# Patient Record
Sex: FEMALE | Race: OTHER | HISPANIC OR LATINO | Employment: UNEMPLOYED | URBAN - METROPOLITAN AREA
[De-identification: names, ages, dates, MRNs, and addresses within clinical notes are randomized per-mention and may not be internally consistent; named-entity substitution may affect disease eponyms.]

---
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
## 2022-09-24 ENCOUNTER — HOSPITAL ENCOUNTER (EMERGENCY)
Facility: HOSPITAL | Age: 22
Discharge: HOME | End: 2022-09-24
Attending: EMERGENCY MEDICINE
Payer: COMMERCIAL

## 2022-09-24 VITALS
SYSTOLIC BLOOD PRESSURE: 118 MMHG | TEMPERATURE: 98.8 F | HEART RATE: 93 BPM | OXYGEN SATURATION: 100 % | DIASTOLIC BLOOD PRESSURE: 71 MMHG | RESPIRATION RATE: 16 BRPM

## 2022-09-24 DIAGNOSIS — K21.9 ACID REFLUX: Primary | ICD-10-CM

## 2022-09-24 LAB
ALBUMIN SERPL BCP-MCNC: 4.1 G/DL (ref 3.5–5)
ALP SERPL-CCNC: 54 U/L (ref 46–116)
ALT SERPL W P-5'-P-CCNC: 18 U/L (ref 12–78)
ANION GAP SERPL CALCULATED.3IONS-SCNC: 8 MMOL/L (ref 4–13)
AST SERPL W P-5'-P-CCNC: 11 U/L (ref 5–45)
BACTERIA UR QL AUTO: ABNORMAL /HPF
BASOPHILS # BLD AUTO: 0.04 THOUSANDS/ΜL (ref 0–0.1)
BASOPHILS NFR BLD AUTO: 1 % (ref 0–1)
BILIRUB SERPL-MCNC: 0.55 MG/DL (ref 0.2–1)
BILIRUB UR QL STRIP: NEGATIVE
BUN SERPL-MCNC: 9 MG/DL (ref 5–25)
CALCIUM SERPL-MCNC: 9 MG/DL (ref 8.3–10.1)
CHLORIDE SERPL-SCNC: 105 MMOL/L (ref 96–108)
CLARITY UR: CLEAR
CO2 SERPL-SCNC: 28 MMOL/L (ref 21–32)
COLOR UR: YELLOW
CREAT SERPL-MCNC: 0.73 MG/DL (ref 0.6–1.3)
EOSINOPHIL # BLD AUTO: 0.07 THOUSAND/ΜL (ref 0–0.61)
EOSINOPHIL NFR BLD AUTO: 1 % (ref 0–6)
ERYTHROCYTE [DISTWIDTH] IN BLOOD BY AUTOMATED COUNT: 11.9 % (ref 11.6–15.1)
EXT PREG TEST URINE: NEGATIVE
EXT. CONTROL ED NAV: NORMAL
GFR SERPL CREATININE-BSD FRML MDRD: 118 ML/MIN/1.73SQ M
GLUCOSE SERPL-MCNC: 105 MG/DL (ref 65–140)
GLUCOSE UR STRIP-MCNC: NEGATIVE MG/DL
HCT VFR BLD AUTO: 40.9 % (ref 34.8–46.1)
HGB BLD-MCNC: 13.8 G/DL (ref 11.5–15.4)
HGB UR QL STRIP.AUTO: ABNORMAL
IMM GRANULOCYTES # BLD AUTO: 0.02 THOUSAND/UL (ref 0–0.2)
IMM GRANULOCYTES NFR BLD AUTO: 0 % (ref 0–2)
KETONES UR STRIP-MCNC: NEGATIVE MG/DL
LEUKOCYTE ESTERASE UR QL STRIP: NEGATIVE
LIPASE SERPL-CCNC: 139 U/L (ref 73–393)
LYMPHOCYTES # BLD AUTO: 2.9 THOUSANDS/ΜL (ref 0.6–4.47)
LYMPHOCYTES NFR BLD AUTO: 43 % (ref 14–44)
MCH RBC QN AUTO: 31.2 PG (ref 26.8–34.3)
MCHC RBC AUTO-ENTMCNC: 33.7 G/DL (ref 31.4–37.4)
MCV RBC AUTO: 93 FL (ref 82–98)
MONOCYTES # BLD AUTO: 0.43 THOUSAND/ΜL (ref 0.17–1.22)
MONOCYTES NFR BLD AUTO: 6 % (ref 4–12)
MUCOUS THREADS UR QL AUTO: ABNORMAL
NEUTROPHILS # BLD AUTO: 3.23 THOUSANDS/ΜL (ref 1.85–7.62)
NEUTS SEG NFR BLD AUTO: 49 % (ref 43–75)
NITRITE UR QL STRIP: NEGATIVE
NON-SQ EPI CELLS URNS QL MICRO: ABNORMAL /HPF
NRBC BLD AUTO-RTO: 0 /100 WBCS
PH UR STRIP.AUTO: 5.5 [PH] (ref 4.5–8)
PLATELET # BLD AUTO: 262 THOUSANDS/UL (ref 149–390)
PMV BLD AUTO: 10.9 FL (ref 8.9–12.7)
POTASSIUM SERPL-SCNC: 3.7 MMOL/L (ref 3.5–5.3)
PROT SERPL-MCNC: 8.4 G/DL (ref 6.4–8.4)
PROT UR STRIP-MCNC: NEGATIVE MG/DL
RBC # BLD AUTO: 4.42 MILLION/UL (ref 3.81–5.12)
RBC #/AREA URNS AUTO: ABNORMAL /HPF
SODIUM SERPL-SCNC: 141 MMOL/L (ref 135–147)
SP GR UR STRIP.AUTO: >=1.03 (ref 1–1.03)
UROBILINOGEN UR QL STRIP.AUTO: 0.2 E.U./DL
WBC # BLD AUTO: 6.69 THOUSAND/UL (ref 4.31–10.16)
WBC #/AREA URNS AUTO: ABNORMAL /HPF

## 2022-09-24 PROCEDURE — 99284 EMERGENCY DEPT VISIT MOD MDM: CPT

## 2022-09-24 PROCEDURE — 83690 ASSAY OF LIPASE: CPT

## 2022-09-24 PROCEDURE — 85025 COMPLETE CBC W/AUTO DIFF WBC: CPT

## 2022-09-24 PROCEDURE — 81025 URINE PREGNANCY TEST: CPT

## 2022-09-24 PROCEDURE — 36415 COLL VENOUS BLD VENIPUNCTURE: CPT

## 2022-09-24 PROCEDURE — 80053 COMPREHEN METABOLIC PANEL: CPT

## 2022-09-24 PROCEDURE — 81001 URINALYSIS AUTO W/SCOPE: CPT

## 2022-09-24 PROCEDURE — 99284 EMERGENCY DEPT VISIT MOD MDM: CPT | Performed by: EMERGENCY MEDICINE

## 2022-09-24 RX ORDER — FAMOTIDINE 20 MG/1
20 TABLET, FILM COATED ORAL 2 TIMES DAILY
Qty: 30 TABLET | Refills: 0 | Status: SHIPPED | OUTPATIENT
Start: 2022-09-24

## 2022-09-24 RX ORDER — MAGNESIUM HYDROXIDE/ALUMINUM HYDROXICE/SIMETHICONE 120; 1200; 1200 MG/30ML; MG/30ML; MG/30ML
30 SUSPENSION ORAL ONCE
Status: COMPLETED | OUTPATIENT
Start: 2022-09-24 | End: 2022-09-24

## 2022-09-24 RX ORDER — SUCRALFATE 1 G/1
1 TABLET ORAL ONCE
Status: COMPLETED | OUTPATIENT
Start: 2022-09-24 | End: 2022-09-24

## 2022-09-24 RX ORDER — FAMOTIDINE 20 MG/1
20 TABLET, FILM COATED ORAL ONCE
Status: COMPLETED | OUTPATIENT
Start: 2022-09-24 | End: 2022-09-24

## 2022-09-24 RX ORDER — ALUMINA, MAGNESIA, AND SIMETHICONE 2400; 2400; 240 MG/30ML; MG/30ML; MG/30ML
10 SUSPENSION ORAL EVERY 6 HOURS PRN
Qty: 355 ML | Refills: 0 | Status: SHIPPED | OUTPATIENT
Start: 2022-09-24

## 2022-09-24 RX ADMIN — ALUMINUM HYDROXIDE, MAGNESIUM HYDROXIDE, AND DIMETHICONE 30 ML: 200; 20; 200 SUSPENSION ORAL at 04:25

## 2022-09-24 RX ADMIN — SUCRALFATE 1 G: 1 TABLET ORAL at 04:25

## 2022-09-24 RX ADMIN — FAMOTIDINE 20 MG: 20 TABLET ORAL at 04:25

## 2022-09-24 NOTE — ED ATTENDING ATTESTATION
2022  Luda MIRELES DO, saw and evaluated the patient  I have discussed the patient with the resident/non-physician practitioner and agree with the resident's/non-physician practitioner's findings, Plan of Care, and MDM as documented in the resident's/non-physician practitioner's note, except where noted  All available labs and Radiology studies were reviewed  I was present for key portions of any procedure(s) performed by the resident/non-physician practitioner and I was immediately available to provide assistance  At this point I agree with the current assessment done in the Emergency Department  I have conducted an independent evaluation of this patient a history and physical is as follows:    Amarjit MIRELES DO, saw and evaluated the patient  All available labs and X-rays were reviewed  I discussed the patient with the resident / non-physician and agree with the resident's / non-physician practitioner's findings and plan as documented in the resident's / non-physician practicitioner's note, except where noted  At this point, I agree with the current assessment done in the ED      NAME: Juan Bermudez  AGE: 23 y o  SEX: female  : 2000   MRN: [id number]  ENCOUNTER: [id number]    DATE: 2022  TIME: 5:34 AM      History of Present Illness   Juan Bermudez is a 23 y o  female who presents with Abdominal Pain ("My stomach hurts everywhere" for one week, denies NVD)    has a past medical history of Scoliosis        Past Medical History     Past Medical History:   Diagnosis Date    Scoliosis        Past Surgical History   History reviewed  No pertinent surgical history      Social History     Social History     Substance and Sexual Activity   Alcohol Use Never     Social History     Substance and Sexual Activity   Drug Use Never     Social History     Tobacco Use   Smoking Status Never Smoker   Smokeless Tobacco Never Used       Family History     Family History   Problem Relation Age of Onset    Anxiety disorder Mother     No Known Problems Father     Hypertension Maternal Grandmother     Diabetes Maternal Grandmother     Breast cancer Paternal Grandmother     Cancer Paternal Grandfather        Medications Prior to Admission     Prior to Admission medications    Medication Sig Start Date End Date Taking? Authorizing Provider   aluminum-magnesium hydroxide-simethicone (MAALOX MAX) 400-400-40 MG/5ML suspension Take 10 mL by mouth every 6 (six) hours as needed for indigestion or heartburn 9/24/22  Yes Jeffery Dent, DO   famotidine (PEPCID) 20 mg tablet Take 1 tablet (20 mg total) by mouth 2 (two) times a day 9/24/22  Yes Andreabhay Dent, DO   benzoyl peroxide-erythromycin (Benzamycin) gel Apply to affected area once or twice daily  9/23/20 9/23/21  Karla Hernandez PA-C       Allergies     Allergies   Allergen Reactions    Amoxil [Amoxicillin] Rash       Objective     Vitals:    09/24/22 0356   BP: 131/68   Pulse: 101   Resp: 18   Temp: 98 8 °F (37 1 °C)   SpO2: 100%     There is no height or weight on file to calculate BMI    No intake or output data in the 24 hours ending 09/24/22 0534  Invasive Devices  Report    Peripheral Intravenous Line  Duration           Peripheral IV 09/24/22 Right Antecubital <1 day                Physical Exam  General: awake, alert, no acute distress  Head: normocephalic, atraumatic  Eyes: no scleral icterus  Ears: external ears normal, hearing grossly intact  Nose: external exam grossly normal  Neck: symmetric, No JVD noted, trachea midline  Pulmonary: no respiratory distress, no tachypnea noted  Cardiovascular: appears well perfused  Abdomen: no distention noted  Musculoskeletal: no deformities noted, tone normal  Neuro: grossly non-focal  Psych: mood and affect appropriate    Lab Results:    Labs Reviewed   URINE MICROSCOPIC - Abnormal       Result Value Ref Range Status    RBC, UA None Seen  None Seen, 2-4 /hpf Final    WBC, UA 0-1 (*) None Seen, 2-4, 5-60 /hpf Final    Epithelial Cells Occasional  None Seen, Occasional /hpf Final    Bacteria, UA Moderate (*) None Seen, Occasional /hpf Final    MUCUS THREADS Occasional (*) None Seen Final   URINE MACROSCOPIC, POC - Abnormal    Color, UA Yellow   Final    Clarity, UA Clear   Final    pH, UA 5 5  4 5 - 8 0 Final    Leukocytes, UA Negative  Negative Final    Nitrite, UA Negative  Negative Final    Protein, UA Negative  Negative mg/dl Final    Glucose, UA Negative  Negative mg/dl Final    Ketones, UA Negative  Negative mg/dl Final    Urobilinogen, UA 0 2  0 2, 1 0 E U /dl E U /dl Final    Bilirubin, UA Negative  Negative Final    Occult Blood, UA Trace (*) Negative Final    Specific Gravity, UA >=1 030  1 003 - 1 030 Final    Narrative:     CLINITEK RESULT   LIPASE - Normal    Lipase 139  73 - 393 u/L Final   POCT PREGNANCY, URINE - Normal    EXT PREG TEST UR (Ref: Negative) Negative   Final    Control Valid   Final   CBC AND DIFFERENTIAL    WBC 6 69  4 31 - 10 16 Thousand/uL Final    RBC 4 42  3 81 - 5 12 Million/uL Final    Hemoglobin 13 8  11 5 - 15 4 g/dL Final    Hematocrit 40 9  34 8 - 46 1 % Final    MCV 93  82 - 98 fL Final    MCH 31 2  26 8 - 34 3 pg Final    MCHC 33 7  31 4 - 37 4 g/dL Final    RDW 11 9  11 6 - 15 1 % Final    MPV 10 9  8 9 - 12 7 fL Final    Platelets 852  777 - 390 Thousands/uL Final    nRBC 0  /100 WBCs Final    Neutrophils Relative 49  43 - 75 % Final    Immat GRANS % 0  0 - 2 % Final    Lymphocytes Relative 43  14 - 44 % Final    Monocytes Relative 6  4 - 12 % Final    Eosinophils Relative 1  0 - 6 % Final    Basophils Relative 1  0 - 1 % Final    Neutrophils Absolute 3 23  1 85 - 7 62 Thousands/µL Final    Immature Grans Absolute 0 02  0 00 - 0 20 Thousand/uL Final    Lymphocytes Absolute 2 90  0 60 - 4 47 Thousands/µL Final    Monocytes Absolute 0 43  0 17 - 1 22 Thousand/µL Final    Eosinophils Absolute 0 07  0 00 - 0 61 Thousand/µL Final    Basophils Absolute 0 04  0 00 - 0 10 Thousands/µL Final   COMPREHENSIVE METABOLIC PANEL    Sodium 618  135 - 147 mmol/L Final    Potassium 3 7  3 5 - 5 3 mmol/L Final    Chloride 105  96 - 108 mmol/L Final    CO2 28  21 - 32 mmol/L Final    ANION GAP 8  4 - 13 mmol/L Final    BUN 9  5 - 25 mg/dL Final    Creatinine 0 73  0 60 - 1 30 mg/dL Final    Comment: Standardized to IDMS reference method    Glucose 105  65 - 140 mg/dL Final    Comment: If the patient is fasting, the ADA then defines impaired fasting glucose as > 100 mg/dL and diabetes as > or equal to 123 mg/dL  Specimen collection should occur prior to Sulfasalazine administration due to the potential for falsely depressed results  Specimen collection should occur prior to Sulfapyridine administration due to the potential for falsely elevated results  Calcium 9 0  8 3 - 10 1 mg/dL Final    AST 11  5 - 45 U/L Final    Comment: Specimen collection should occur prior to Sulfasalazine administration due to the potential for falsely depressed results  ALT 18  12 - 78 U/L Final    Comment: Specimen collection should occur prior to Sulfasalazine administration due to the potential for falsely depressed results  Alkaline Phosphatase 54  46 - 116 U/L Final    Total Protein 8 4  6 4 - 8 4 g/dL Final    Albumin 4 1  3 5 - 5 0 g/dL Final    Total Bilirubin 0 55  0 20 - 1 00 mg/dL Final    Comment: Use of this assay is not recommended for patients undergoing treatment with eltrombopag due to the potential for falsely elevated results      eGFR 118  ml/min/1 73sq m Final    Narrative:     [place] guidelines for Chronic Kidney Disease (CKD):     Stage 1 with normal or high GFR (GFR > 90 mL/min/1 73 square meters)    Stage 2 Mild CKD (GFR = 60-89 mL/min/1 73 square meters)    Stage 3A Moderate CKD (GFR = 45-59 mL/min/1 73 square meters)    Stage 3B Moderate CKD (GFR = 30-44 mL/min/1 73 square meters)    Stage 4 Severe CKD (GFR = 15-29 mL/min/1 73 square meters)   Stage 5 End Stage CKD (GFR <15 mL/min/1 73 square meters)  Note: GFR calculation is accurate only with a steady state creatinine         Imaging:   No orders to display         Medications given in Emergency Department     Medication Administration - last 24 hours from 09/23/2022 0534 to 09/24/2022 0534       Date/Time Order Dose Route Action Action by     09/24/2022 0425 famotidine (PEPCID) tablet 20 mg 20 mg Oral Given Chana Solomon, KAMILLE     09/24/2022 0425 sucralfate (CARAFATE) tablet 1 g 1 g Oral Given Chana Solomon RN     09/24/2022 0425 aluminum-magnesium hydroxide-simethicone (MYLANTA) oral suspension 30 mL 30 mL Oral Given Chana Solomon RN          Assessment and Plan  Abdominal pain    Patient appears comfortable on exam   Symptoms appear consistent with a probable gastritis  Will check labs, urine pregnancy, treat supportively with GI medications such as Pepcid, Carafate Mylanta and re-evaluate  Labs are normal   Patient feeling much better after treatment  Will discharge home with instructions for acid reflux and return ER precautions  Active Problems:    * No active hospital problems  *      Final Diagnosis:  1   Acid reflux        ED Course         Critical Care Time  Procedures

## 2022-09-24 NOTE — ED NOTES
Patient aware that urine specimen is needed   Unable to provide urine sample at this time     Marko Sanders RN  09/24/22 4139

## 2022-09-24 NOTE — ED PROVIDER NOTES
History  Chief Complaint   Patient presents with    Abdominal Pain     "My stomach hurts everywhere" for one week, denies NVD     Patient is a 53-year-old female with no significant past medical history, presenting to the ED for evaluation of 1 week history abdominal pain described as intermittent sharp, burning pains in the upper abdomen  She states that the pain is worse when lying flat  Tonight, the pain worker about asleep, associated with nausea and increased anxiety, prompting ED evaluation  Patient denies vomiting, diarrhea, chest pain, difficulty breathing  She denies any urinary symptoms and denies pregnancy, stating that she just finished her period last week  Patient states that her mother has history of acid reflux and irritable bowel disease, and she took 1 of her mother's pills that was for stomach inflammation and reports some modest improvement of the pain  Patient states that the pain is not associated with eating  She denies fevers, chills, right upper quadrant pain  Prior to Admission Medications   Prescriptions Last Dose Informant Patient Reported? Taking?   benzoyl peroxide-erythromycin (Benzamycin) gel   No No   Sig: Apply to affected area once or twice daily  Facility-Administered Medications: None       Past Medical History:   Diagnosis Date    Scoliosis        History reviewed  No pertinent surgical history  Family History   Problem Relation Age of Onset    Anxiety disorder Mother     No Known Problems Father     Hypertension Maternal Grandmother     Diabetes Maternal Grandmother     Breast cancer Paternal Grandmother     Cancer Paternal Grandfather      I have reviewed and agree with the history as documented      E-Cigarette/Vaping     E-Cigarette/Vaping Substances     Social History     Tobacco Use    Smoking status: Never Smoker    Smokeless tobacco: Never Used   Substance Use Topics    Alcohol use: Never    Drug use: Never        Review of Systems Constitutional: Negative for chills and fever  HENT: Negative for congestion, ear pain, postnasal drip and sore throat  Eyes: Negative for pain and visual disturbance  Respiratory: Negative for cough and shortness of breath  Cardiovascular: Negative for chest pain and palpitations  Gastrointestinal: Positive for abdominal pain and nausea  Negative for vomiting  Genitourinary: Negative for difficulty urinating, dysuria, frequency, hematuria and urgency  Musculoskeletal: Negative for arthralgias, back pain, myalgias and neck pain  Skin: Negative for color change and rash  Neurological: Negative for dizziness, seizures, syncope, light-headedness and headaches  All other systems reviewed and are negative  Physical Exam  ED Triage Vitals [09/24/22 0356]   Temperature Pulse Respirations Blood Pressure SpO2   98 8 °F (37 1 °C) 101 18 131/68 100 %      Temp src Heart Rate Source Patient Position - Orthostatic VS BP Location FiO2 (%)   -- -- -- -- --      Pain Score       --             Orthostatic Vital Signs  Vitals:    09/24/22 0356   BP: 131/68   Pulse: 101       Physical Exam  Vitals and nursing note reviewed  Constitutional:       General: She is not in acute distress  Appearance: She is well-developed and normal weight  She is not ill-appearing or toxic-appearing  HENT:      Head: Normocephalic and atraumatic  Mouth/Throat:      Mouth: Mucous membranes are moist    Eyes:      Extraocular Movements: Extraocular movements intact  Conjunctiva/sclera: Conjunctivae normal       Pupils: Pupils are equal, round, and reactive to light  Cardiovascular:      Rate and Rhythm: Normal rate and regular rhythm  Heart sounds: No murmur heard  Pulmonary:      Effort: Pulmonary effort is normal  No respiratory distress  Breath sounds: Normal breath sounds  No wheezing, rhonchi or rales  Abdominal:      General: Abdomen is flat   Bowel sounds are normal       Palpations: Abdomen is soft  Tenderness: There is abdominal tenderness in the epigastric area  There is no guarding  Negative signs include Pretty's sign  Hernia: No hernia is present  Musculoskeletal:      Cervical back: Neck supple  Skin:     General: Skin is warm and dry  Capillary Refill: Capillary refill takes less than 2 seconds  Neurological:      Mental Status: She is alert and oriented to person, place, and time     Psychiatric:         Mood and Affect: Mood normal          ED Medications  Medications   famotidine (PEPCID) tablet 20 mg (20 mg Oral Given 9/24/22 0425)   sucralfate (CARAFATE) tablet 1 g (1 g Oral Given 9/24/22 0425)   aluminum-magnesium hydroxide-simethicone (MYLANTA) oral suspension 30 mL (30 mL Oral Given 9/24/22 0425)       Diagnostic Studies  Results Reviewed     Procedure Component Value Units Date/Time    Urine Microscopic [[id number]]  (Abnormal) Collected: 09/24/22 0447    Lab Status: Final result Specimen: Urine, Clean Catch Updated: 09/24/22 0503     RBC, UA None Seen /hpf      WBC, UA 0-1 /hpf      Epithelial Cells Occasional /hpf      Bacteria, UA Moderate /hpf      MUCUS THREADS Occasional    POCT pregnancy, urine [[id number]]  (Normal) Resulted: 09/24/22 0449    Lab Status: Final result Updated: 09/24/22 0450     EXT PREG TEST UR (Ref: Negative) Negative     Control Valid    Urine Macroscopic, POC [[id number]]  (Abnormal) Collected: 09/24/22 0447    Lab Status: Final result Specimen: Urine Updated: 09/24/22 0449     Color, UA Yellow     Clarity, UA Clear     pH, UA 5 5     Leukocytes, UA Negative     Nitrite, UA Negative     Protein, UA Negative mg/dl      Glucose, UA Negative mg/dl      Ketones, UA Negative mg/dl      Urobilinogen, UA 0 2 E U /dl      Bilirubin, UA Negative     Occult Blood, UA Trace     Specific Gravity, UA >=1 030    Narrative:      CLINITEK RESULT    Comprehensive metabolic panel [[id number]] Collected: 09/24/22 0424    Lab Status: Final result Specimen: Blood from Arm, Right Updated: 09/24/22 0448     Sodium 141 mmol/L      Potassium 3 7 mmol/L      Chloride 105 mmol/L      CO2 28 mmol/L      ANION GAP 8 mmol/L      BUN 9 mg/dL      Creatinine 0 73 mg/dL      Glucose 105 mg/dL      Calcium 9 0 mg/dL      AST 11 U/L      ALT 18 U/L      Alkaline Phosphatase 54 U/L      Total Protein 8 4 g/dL      Albumin 4 1 g/dL      Total Bilirubin 0 55 mg/dL      eGFR 118 ml/min/1 73sq m     Narrative:      [place] guidelines for Chronic Kidney Disease (CKD):     Stage 1 with normal or high GFR (GFR > 90 mL/min/1 73 square meters)    Stage 2 Mild CKD (GFR = 60-89 mL/min/1 73 square meters)    Stage 3A Moderate CKD (GFR = 45-59 mL/min/1 73 square meters)    Stage 3B Moderate CKD (GFR = 30-44 mL/min/1 73 square meters)    Stage 4 Severe CKD (GFR = 15-29 mL/min/1 73 square meters)    Stage 5 End Stage CKD (GFR <15 mL/min/1 73 square meters)  Note: GFR calculation is accurate only with a steady state creatinine    Lipase [[id number]]  (Normal) Collected: 09/24/22 0424    Lab Status: Final result Specimen: Blood from Arm, Right Updated: 09/24/22 0448     Lipase 139 u/L     CBC and differential [[id number]] Collected: 09/24/22 0424    Lab Status: Final result Specimen: Blood from Arm, Right Updated: 09/24/22 0434     WBC 6 69 Thousand/uL      RBC 4 42 Million/uL      Hemoglobin 13 8 g/dL      Hematocrit 40 9 %      MCV 93 fL      MCH 31 2 pg      MCHC 33 7 g/dL      RDW 11 9 %      MPV 10 9 fL      Platelets 181 Thousands/uL      nRBC 0 /100 WBCs      Neutrophils Relative 49 %      Immat GRANS % 0 %      Lymphocytes Relative 43 %      Monocytes Relative 6 %      Eosinophils Relative 1 %      Basophils Relative 1 %      Neutrophils Absolute 3 23 Thousands/µL      Immature Grans Absolute 0 02 Thousand/uL      Lymphocytes Absolute 2 90 Thousands/µL      Monocytes Absolute 0 43 Thousand/µL      Eosinophils Absolute 0 07 Thousand/µL      Basophils Absolute 0 04 Thousands/µL                  No orders to display         Procedures  Procedures      ED Course        symptoms are consistent with acid reflux, possibly gastritis  Will do CBC, CMP, lipase to evaluate for possible electrolyte abnormality, pancreatitis  UA ordered with urine preg  Patient given Pepcid p o  And Maalox p o  On re-evaluation, patient reports moderate improvement the pain  I discussed with she should see her PCP to arrange prompt possibly see a GI specialist   Patient will be started on Pepcid and Maalox at home  She agrees with the plan and verbalized her understanding  She is ambulatory at discharge without issue  MDM    Disposition  Final diagnoses:   Acid reflux     Time reflects when diagnosis was documented in both MDM as applicable and the Disposition within this note     Time User Action Codes Description Comment    9/24/2022  5:25 AM Ivankaren Mobley Add [K21 9] Acid reflux       ED Disposition     ED Disposition   Discharge    Condition   Stable    Date/Time   Sat Sep 24, 2022  5:25 AM    Comment   Sharlene Moreno discharge to home/self care  Follow-up Information     Follow up With Specialties Details Why Contact Info    Jacque Lesches, MD Pediatrics   [address]  [facility] [address]  [place] [address]  [phone]            Patient's Medications   Discharge Prescriptions    ALUMINUM-MAGNESIUM HYDROXIDE-SIMETHICONE (MAALOX MAX) 400-400-40 MG/5ML SUSPENSION    Take 10 mL by mouth every 6 (six) hours as needed for indigestion or heartburn       Start Date: 9/24/2022 End Date: --       Order Dose: 10 mL       Quantity: 355 mL    Refills: 0    FAMOTIDINE (PEPCID) 20 MG TABLET    Take 1 tablet (20 mg total) by mouth 2 (two) times a day       Start Date: 9/24/2022 End Date: --       Order Dose: 20 mg       Quantity: 30 tablet    Refills: 0     No discharge procedures on file      PDMP Review     None           ED Provider  Attending physically available and evaluated Husam Gill [place] Doctor  I managed the patient along with the ED Attending      Electronically Signed by         Nayely Thorpe DO  09/24/22 8130

## 2022-09-24 NOTE — DISCHARGE INSTRUCTIONS
I believe symptoms are due to irritation of the stomach lining, consistent with gastritis  and/or GERD  Please take Pepcid and Maalox as prescribed for symptoms  Stay away from chocolate, spicy food, coffee, Motrin  Take Tylenol for discomfort  Return to the ED with any new/concerning issues

## 2023-12-08 ENCOUNTER — HOSPITAL ENCOUNTER (EMERGENCY)
Facility: HOSPITAL | Age: 23
Discharge: HOME | End: 2023-12-08
Attending: EMERGENCY MEDICINE

## 2023-12-08 VITALS
HEART RATE: 102 BPM | TEMPERATURE: 97.7 F | BODY MASS INDEX: 19.22 KG/M2 | OXYGEN SATURATION: 97 % | RESPIRATION RATE: 19 BRPM | SYSTOLIC BLOOD PRESSURE: 104 MMHG | DIASTOLIC BLOOD PRESSURE: 70 MMHG | WEIGHT: 97.88 LBS

## 2023-12-08 DIAGNOSIS — F41.0 PANIC ATTACKS: Primary | ICD-10-CM

## 2023-12-08 DIAGNOSIS — K21.9 GERD (GASTROESOPHAGEAL REFLUX DISEASE): ICD-10-CM

## 2023-12-08 LAB
EXT PREGNANCY TEST URINE: NEGATIVE
EXT. CONTROL: NORMAL

## 2023-12-08 PROCEDURE — 99284 EMERGENCY DEPT VISIT MOD MDM: CPT

## 2023-12-08 PROCEDURE — 99283 EMERGENCY DEPT VISIT LOW MDM: CPT

## 2023-12-08 PROCEDURE — 81025 URINE PREGNANCY TEST: CPT

## 2023-12-08 RX ORDER — HYDROXYZINE HYDROCHLORIDE 25 MG/1
25 TABLET, FILM COATED ORAL ONCE
Status: COMPLETED | OUTPATIENT
Start: 2023-12-08 | End: 2023-12-08

## 2023-12-08 RX ORDER — HYDROXYZINE HYDROCHLORIDE 25 MG/1
25 TABLET, FILM COATED ORAL EVERY 6 HOURS
Qty: 12 TABLET | Refills: 0 | Status: SHIPPED | OUTPATIENT
Start: 2023-12-08

## 2023-12-08 RX ORDER — FAMOTIDINE 20 MG/1
20 TABLET, FILM COATED ORAL ONCE
Status: COMPLETED | OUTPATIENT
Start: 2023-12-08 | End: 2023-12-08

## 2023-12-08 RX ORDER — FAMOTIDINE 20 MG/1
20 TABLET, FILM COATED ORAL 2 TIMES DAILY
Qty: 30 TABLET | Refills: 0 | Status: SHIPPED | OUTPATIENT
Start: 2023-12-08

## 2023-12-08 RX ADMIN — HYDROXYZINE HYDROCHLORIDE 25 MG: 25 TABLET, FILM COATED ORAL at 22:11

## 2023-12-08 RX ADMIN — FAMOTIDINE 20 MG: 20 TABLET ORAL at 22:12

## 2023-12-09 NOTE — DISCHARGE INSTRUCTIONS
-Regarding your anxiety and panic attacks: you can take Atarax up to once every 6 hours as needed to treat acute symptoms. I also highly recommend that you follow-up with a primary care provider for reevaluation and consider starting a medication such as an SSRI for long-term management of your anxiety and panic attacks.  -Regarding your acid reflux: Please start taking Pepcid 1-2 times daily, take at least 30 minutes prior to any meals.

## 2023-12-09 NOTE — ED PROVIDER NOTES
History  Chief Complaint   Patient presents with    Panic Attack     Pt reports panic attack started about 20 mins ago, states she has been dealing with panic attacks for a few months. 59-year-old female presents for evaluation of panic attack. This began approximately 20 minutes PTA, no meds PTA; complaining of nausea, anxiety, and palpitations. States she has been dealing with similar panic attacks for many months, experiencing these nearly daily, getting to the point where it is drastically affecting her life, work, etc.  Has not been evaluated by provider for these in the past, has never been on any medications for it. Has numerous things that trigger her anxiety, one of which is when she gets flareups of her acid reflux. Does have family history of both psychiatric illness/anxiety, as well as GERD. Prior to Admission Medications   Prescriptions Last Dose Informant Patient Reported? Taking?   aluminum-magnesium hydroxide-simethicone (MAALOX MAX) 400-400-40 MG/5ML suspension   No No   Sig: Take 10 mL by mouth every 6 (six) hours as needed for indigestion or heartburn   benzoyl peroxide-erythromycin (Benzamycin) gel   No No   Sig: Apply to affected area once or twice daily. famotidine (PEPCID) 20 mg tablet   No No   Sig: Take 1 tablet (20 mg total) by mouth 2 (two) times a day      Facility-Administered Medications: None       Past Medical History:   Diagnosis Date    Scoliosis        History reviewed. No pertinent surgical history. Family History   Problem Relation Age of Onset    Anxiety disorder Mother     No Known Problems Father     Hypertension Maternal Grandmother     Diabetes Maternal Grandmother     Breast cancer Paternal Grandmother     Cancer Paternal Grandfather      I have reviewed and agree with the history as documented.     E-Cigarette/Vaping     E-Cigarette/Vaping Substances     Social History     Tobacco Use    Smoking status: Never    Smokeless tobacco: Never   Substance Use Topics    Alcohol use: Never    Drug use: Never       Review of Systems   Constitutional:  Negative for chills and fever. HENT:  Negative for ear pain and sore throat. Eyes:  Negative for pain and visual disturbance. Respiratory:  Negative for cough and shortness of breath. Cardiovascular:  Positive for palpitations. Negative for chest pain. Gastrointestinal:  Positive for nausea. Negative for abdominal pain and vomiting. Acid reflux. Genitourinary:  Negative for dysuria and hematuria. Musculoskeletal:  Negative for arthralgias and back pain. Skin:  Negative for color change and rash. Neurological:  Negative for seizures and syncope. Psychiatric/Behavioral:  The patient is nervous/anxious. All other systems reviewed and are negative. Physical Exam  Physical Exam  Vitals and nursing note reviewed. Constitutional:       General: She is in acute distress. Appearance: She is well-developed. HENT:      Head: Normocephalic and atraumatic. Eyes:      Conjunctiva/sclera: Conjunctivae normal.   Cardiovascular:      Rate and Rhythm: Normal rate and regular rhythm. Heart sounds: No murmur heard. Pulmonary:      Effort: Pulmonary effort is normal. No respiratory distress. Breath sounds: Normal breath sounds. Abdominal:      Palpations: Abdomen is soft. Tenderness: There is no abdominal tenderness. Musculoskeletal:         General: No swelling. Cervical back: Neck supple. Skin:     General: Skin is warm and dry. Capillary Refill: Capillary refill takes less than 2 seconds. Neurological:      Mental Status: She is alert. Psychiatric:         Mood and Affect: Mood is anxious.          Vital Signs  ED Triage Vitals [12/08/23 2142]   Temperature Pulse Respirations Blood Pressure SpO2   97.7 °F (36.5 °C) (!) 131 20 133/64 100 %      Temp Source Heart Rate Source Patient Position - Orthostatic VS BP Location FiO2 (%)   Oral Monitor Sitting Right arm -- Pain Score       --           Vitals:    12/08/23 2142 12/08/23 2215   BP: 133/64 104/70   Pulse: (!) 131 102   Patient Position - Orthostatic VS: Sitting          Visual Acuity      ED Medications  Medications   hydrOXYzine HCL (ATARAX) tablet 25 mg (25 mg Oral Given 12/8/23 2211)   famotidine (PEPCID) tablet 20 mg (20 mg Oral Given 12/8/23 2212)       Diagnostic Studies  Results Reviewed       Procedure Component Value Units Date/Time    POCT pregnancy, urine [[id number]]  (Normal) Resulted: 12/08/23 2208    Lab Status: Final result Updated: 12/08/23 2208     EXT Preg Test, Ur Negative     Control Valid                   No orders to display              Procedures  Procedures         ED Course                               SBIRT 22yo+      Flowsheet Row Most Recent Value   Initial Alcohol Screen: US AUDIT-C     1. How often do you have a drink containing alcohol? 0 Filed at: 12/08/2023 2149   2. How many drinks containing alcohol do you have on a typical day you are drinking? 0 Filed at: 12/08/2023 2149   3b. FEMALE Any Age, or MALE 65+: How often do you have 4 or more drinks on one occassion? 0 Filed at: 12/08/2023 2149   Audit-C Score 0 Filed at: 12/08/2023 2149   BROCK: How many times in the past year have you. .. Used an illegal drug or used a prescription medication for non-medical reasons? Never Filed at: 12/08/2023 2149                      Medical Decision Making  28-year-old female presents for evaluation of panic attack. Complaining of anxiety, nausea, and palpitations. Has never been evaluated or treated for these panic attacks in the past.  Exam: Patient appears distressed, anxious, shaky; tachycardic, normal respiratory effort. Workup: POCT pregnancy. Thoroughly discussed anxiety evaluation and management with patient.   Do not think that EKG or other workup is necessary at this time, as patient has been having these episodes nearly daily for many months, she is not having any chest pain or difficulty breathing, vitals improving with time and supportive care. Recommended that she would most likely benefit from a daily SSRI, this can be provided through a primary care provider, she is in agreement with this idea and will f/u outpatient. Will also treat symptomatically with Atarax. Will treat patient's GERD with Pepcid 1-2 times daily. Patient expresses understanding of the condition, treatment plan, follow-up instructions, and return precautions. Amount and/or Complexity of Data Reviewed  Labs: ordered. Risk  Prescription drug management. Disposition  Final diagnoses:   Panic attacks   GERD (gastroesophageal reflux disease)     Time reflects when diagnosis was documented in both MDM as applicable and the Disposition within this note       Time User Action Codes Description Comment    12/8/2023 10:55 PM Roseline Johnson Add [F41.0] Panic attacks     12/8/2023 10:55 PM Roseline Johnson Add [K21.9] GERD (gastroesophageal reflux disease)           ED Disposition       ED Disposition   Discharge    Condition   Stable    Date/Time   Fri Dec 8, 2023 8330 [id number] [facility] discharge to home/self care.                    Follow-up Information       Follow up With Specialties Details Why Contact Info Additional [address] [facility] Schedule an appointment as soon as possible for a visit  As needed [address] [facility], [facility] [facility] [id number]  [address] [facility], [address] [facility], [address] [facility]            Discharge Medication List as of 12/8/2023 11:21 PM        START taking these medications    Details   !! famotidine (PEPCID) 20 mg tablet Take 1 tablet (20 mg total) by mouth 2 (two) times a day, Starting Fri 12/8/2023, Normal      hydrOXYzine HCL (ATARAX) 25 mg tablet Take 1 tablet (25 mg total) by mouth every 6 (six) hours, Starting Fri 12/8/2023, Normal       !! - Potential duplicate medications found. Please discuss with provider. CONTINUE these medications which have NOT CHANGED    Details   aluminum-magnesium hydroxide-simethicone (MAALOX MAX) 400-400-40 MG/5ML suspension Take 10 mL by mouth every 6 (six) hours as needed for indigestion or heartburn, Starting Sat 9/24/2022, Normal      benzoyl peroxide-erythromycin (Benzamycin) gel Apply to affected area once or twice daily. , Normal      !! famotidine (PEPCID) 20 mg tablet Take 1 tablet (20 mg total) by mouth 2 (two) times a day, Starting Sat 9/24/2022, Normal       !! - Potential duplicate medications found. Please discuss with provider. No discharge procedures on file.     PDMP Review       None            ED Provider  Electronically Signed by             Artie Gill PA-C  12/10/23 9322

## 2023-12-13 PROBLEM — K21.9 GERD (GASTROESOPHAGEAL REFLUX DISEASE): Status: ACTIVE | Noted: 2023-12-13

## 2023-12-13 PROBLEM — F41.0 PANIC ATTACKS: Status: ACTIVE | Noted: 2023-12-13

## 2024-01-28 ENCOUNTER — HOSPITAL ENCOUNTER (EMERGENCY)
Facility: HOSPITAL | Age: 24
Discharge: HOME | End: 2024-01-28
Attending: EMERGENCY MEDICINE

## 2024-01-28 VITALS
BODY MASS INDEX: 18.01 KG/M2 | WEIGHT: 91.71 LBS | HEART RATE: 90 BPM | SYSTOLIC BLOOD PRESSURE: 113 MMHG | DIASTOLIC BLOOD PRESSURE: 65 MMHG | TEMPERATURE: 98.1 F | OXYGEN SATURATION: 98 % | RESPIRATION RATE: 16 BRPM | HEIGHT: 60 IN

## 2024-01-28 DIAGNOSIS — J06.9 URI (UPPER RESPIRATORY INFECTION): ICD-10-CM

## 2024-01-28 DIAGNOSIS — H92.02 EARACHE ON LEFT: Primary | ICD-10-CM

## 2024-01-28 PROCEDURE — 99284 EMERGENCY DEPT VISIT MOD MDM: CPT | Performed by: PHYSICIAN ASSISTANT

## 2024-01-28 PROCEDURE — 99282 EMERGENCY DEPT VISIT SF MDM: CPT

## 2024-01-28 NOTE — DISCHARGE INSTRUCTIONS
Take Tylenol or motrin for pain or fever. Take as directed on the box.   Use over the counter cold and flu medicine for cough and congestion. Recommend Zarbees.   Use Flonase or nasal saline spray for nasal and ear congestion.    Follow up with your family doctor if symptoms do not improve over the next couple of days.

## 2024-01-28 NOTE — ED PROVIDER NOTES
History  Chief Complaint   Patient presents with    Earache     Pt reports URI for 1 week, today c/o severe left earache.     This is a 23-year-old female presenting to ED for evaluation of left ear pain.  Patient states she has had URI symptoms for approximately 1 week.  Patient had taken DayQuil initially for symptoms.  Patient states tonight she started with left-sided ear pain after blowing her nose.  Patient states she took 2 Tylenol which have now improved her symptoms.  She states initially that did not improve symptoms.  Patient states initially she had felt congestion in the left ear which is improving as well.  She denies any fevers.  She endorses congestion and cough.  Patient works at a .      History provided by:  Patient   used: No        Prior to Admission Medications   Prescriptions Last Dose Informant Patient Reported? Taking?   aluminum-magnesium hydroxide-simethicone (MAALOX MAX) 400-400-40 MG/5ML suspension   No No   Sig: Take 10 mL by mouth every 6 (six) hours as needed for indigestion or heartburn   benzoyl peroxide-erythromycin (Benzamycin) gel   No No   Sig: Apply to affected area once or twice daily.   famotidine (PEPCID) 20 mg tablet   No No   Sig: Take 1 tablet (20 mg total) by mouth 2 (two) times a day   hydrOXYzine HCL (ATARAX) 25 mg tablet   No No   Sig: Take 1 tablet (25 mg total) by mouth every 6 (six) hours      Facility-Administered Medications: None       Past Medical History:   Diagnosis Date    Scoliosis        History reviewed. No pertinent surgical history.    Family History   Problem Relation Age of Onset    Anxiety disorder Mother     No Known Problems Father     Hypertension Maternal Grandmother     Diabetes Maternal Grandmother     Breast cancer Paternal Grandmother     Cancer Paternal Grandfather      I have reviewed and agree with the history as documented.    E-Cigarette/Vaping     E-Cigarette/Vaping Substances     Social History     Tobacco  Use    Smoking status: Never    Smokeless tobacco: Never   Substance Use Topics    Alcohol use: Never    Drug use: Never       Review of Systems   Constitutional:  Negative for chills and fever.   HENT:  Positive for congestion and ear pain. Negative for ear discharge.    Respiratory:  Positive for cough.    Neurological:  Negative for dizziness.   All other systems reviewed and are negative.      Physical Exam  Physical Exam  Vitals reviewed.   Constitutional:       General: She is not in acute distress.     Appearance: Normal appearance. She is well-developed and well-groomed. She is not ill-appearing.   HENT:      Head: Normocephalic and atraumatic.      Right Ear: Tympanic membrane, ear canal and external ear normal.      Left Ear: Ear canal and external ear normal. A middle ear effusion is present. Tympanic membrane is injected.      Nose: Mucosal edema present.      Mouth/Throat:      Lips: Pink.      Mouth: Mucous membranes are moist.   Eyes:      General: No scleral icterus.     Conjunctiva/sclera: Conjunctivae normal.   Cardiovascular:      Rate and Rhythm: Normal rate and regular rhythm.   Pulmonary:      Effort: Pulmonary effort is normal.      Breath sounds: No stridor.   Abdominal:      General: There is no distension.   Musculoskeletal:         General: No deformity. Normal range of motion.      Cervical back: Normal range of motion.   Skin:     Coloration: Skin is not jaundiced or pale.      Findings: No lesion or rash.   Neurological:      Mental Status: She is alert and oriented to person, place, and time.   Psychiatric:         Mood and Affect: Mood normal.         Behavior: Behavior normal. Behavior is cooperative.         Vital Signs  ED Triage Vitals [01/28/24 0047]   Temperature Pulse Respirations Blood Pressure SpO2   98.1 °F (36.7 °C) 90 16 113/65 98 %      Temp Source Heart Rate Source Patient Position - Orthostatic VS BP Location FiO2 (%)   Oral Monitor -- -- --      Pain Score       7            Vitals:    01/28/24 0047   BP: 113/65   Pulse: 90         Visual Acuity      ED Medications  Medications - No data to display    Diagnostic Studies  Results Reviewed       None                   No orders to display              Procedures  Procedures         ED Course                               SBIRT 20yo+      Flowsheet Row Most Recent Value   Initial Alcohol Screen: US AUDIT-C     1. How often do you have a drink containing alcohol? 0 Filed at: 01/28/2024 0052   2. How many drinks containing alcohol do you have on a typical day you are drinking?  0 Filed at: 01/28/2024 0052   3b. FEMALE Any Age, or MALE 65+: How often do you have 4 or more drinks on one occassion? 0 Filed at: 01/28/2024 0052   Audit-C Score 0 Filed at: 01/28/2024 0052   BROCK: How many times in the past year have you...    Used an illegal drug or used a prescription medication for non-medical reasons? Never Filed at: 01/28/2024 0052                      Medical Decision Making      DDx including but not limited to: Otitis media, otitis externa, bullous myringitis, perforated TM, impacted cerumen, cellulitis.     Patient presenting to ED for evaluation of left ear pain starting this evening.  Pain improved with Tylenol.  On exam patient does have some injection to the left TM with middle ear effusion.  Likely viral infection causing ear pain.  Discussed with patient to continue to monitor ear pain, treat symptomatically with Flonase to help with drainage of fluid and use Tylenol Motrin for pain control.  Discussed to continue to monitor for any high fevers, drainage from the ear, worsening pain, posterior ear pain, etc.  Recommend patient follow-up with PCP as needed.  Patient is agreeable with plan of care and discharge at this time.  Reviewed strict return precautions with patient prior to discharge.    Prior to discharge, discharge instructions were discussed with patient at bedside. Patient was provided both verbal and written  instructions. Patient is understanding of the discharge instructions and is agreeable to plan of care. Return precautions were discussed with patient bedside, patient verbalized understanding of signs and symptoms that would necessitate return to the ED. All questions were answered. Patient was comfortable with the plan of care and discharged to home.     Dispo: discharge home with follow up to PCP. Patient stable, in no acute distress and non-toxic at discharge.    Problems Addressed:  Earache on left: acute illness or injury  URI (upper respiratory infection): acute illness or injury             Disposition  Final diagnoses:   Earache on left   URI (upper respiratory infection)     Time reflects when diagnosis was documented in both MDM as applicable and the Disposition within this note       Time User Action Codes Description Comment    1/28/2024  1:15 AM Pat Kumar Add [H92.02] Earache on left     1/28/2024  1:15 AM Pat Kumar Add [J06.9] URI (upper respiratory infection)           ED Disposition       ED Disposition   Discharge    Condition   Stable    Date/Time   Sun Jan 28, 2024 0115    Comment   Ning Blankenship discharge to home/self care.                   Follow-up Information       Follow up With Specialties Details Why Contact Info Additional Information    [facility] Schedule an appointment as soon as possible for a visit   [facility] , [facility] [address]  [place] [address]  [phone] [facility], [phone]     Via the [address] the [place] [address] (North/South) Take [address] toward [place]. Take the [facility] [address]. Keep right and follow signs for [address]/ [place]. Merge onto [address]. In a half mile, take the exit for [address] toward [place]. In 0.7 miles take the [facility] Exit. Merge onto [facility]. In 500 feet, turn left on [facility] [address] and drive 0.3 miles. [facility]  [facility] will be on your left.    [address]ia [address] (North/South) Take [address] toward [place]. Take the [facility] Exit. Merge onto [facility]. In 500 feet, turn left on [address] and drive 0.3 miles. [facility] will be on your left.  [address]ia [address] (East/West) Take [address] towards [place]. In 0.7 miles take the [facility] Exit. Merge onto [facility]. In 500 feet, turn left on [address] and drive 0.3 miles. [facility] will be on your left.            Discharge Medication List as of 1/28/2024  1:17 AM        CONTINUE these medications which have NOT CHANGED    Details   aluminum-magnesium hydroxide-simethicone (MAALOX MAX) 400-400-40 MG/5ML suspension Take 10 mL by mouth every 6 (six) hours as needed for indigestion or heartburn, Starting Sat 9/24/2022, Normal      benzoyl peroxide-erythromycin (Benzamycin) gel Apply to affected area once or twice daily., Normal      famotidine (PEPCID) 20 mg tablet Take 1 tablet (20 mg total) by mouth 2 (two) times a day, Starting Sat 9/24/2022, Normal      hydrOXYzine HCL (ATARAX) 25 mg tablet Take 1 tablet (25 mg total) by mouth every 6 (six) hours, Starting Fri 12/8/2023, Normal             No discharge procedures on file.    PDMP Review       None            ED Provider  Electronically Signed by Pat Kumar PA-C  01/28/24 8971

## 2024-03-24 ENCOUNTER — APPOINTMENT (OUTPATIENT)
Dept: URGENT CARE | Facility: MEDICAL CENTER | Age: 24
End: 2024-03-24

## 2024-07-17 ENCOUNTER — HOSPITAL ENCOUNTER (EMERGENCY)
Facility: HOSPITAL | Age: 24
Discharge: HOME | End: 2024-07-17
Attending: EMERGENCY MEDICINE

## 2024-07-17 VITALS
TEMPERATURE: 98.1 F | WEIGHT: 106.7 LBS | HEART RATE: 68 BPM | DIASTOLIC BLOOD PRESSURE: 71 MMHG | RESPIRATION RATE: 16 BRPM | SYSTOLIC BLOOD PRESSURE: 121 MMHG | BODY MASS INDEX: 21.01 KG/M2 | OXYGEN SATURATION: 99 %

## 2024-07-17 DIAGNOSIS — N89.8 VAGINAL DISCHARGE: Primary | ICD-10-CM

## 2024-07-17 DIAGNOSIS — N76.0 BACTERIAL VAGINOSIS: ICD-10-CM

## 2024-07-17 DIAGNOSIS — B96.89 BACTERIAL VAGINOSIS: ICD-10-CM

## 2024-07-17 LAB
BACTERIA UR QL AUTO: ABNORMAL /HPF
BILIRUB UR QL STRIP: NEGATIVE
CLARITY UR: CLEAR
COLOR UR: YELLOW
EXT PREGNANCY TEST URINE: NEGATIVE
EXT. CONTROL: NORMAL
GLUCOSE UR STRIP-MCNC: NEGATIVE MG/DL
HGB UR QL STRIP.AUTO: ABNORMAL
KETONES UR STRIP-MCNC: ABNORMAL MG/DL
LEUKOCYTE ESTERASE UR QL STRIP: ABNORMAL
MUCOUS THREADS UR QL AUTO: ABNORMAL
NITRITE UR QL STRIP: NEGATIVE
NON-SQ EPI CELLS URNS QL MICRO: ABNORMAL /HPF
PH UR STRIP.AUTO: 5.5 [PH] (ref 4.5–8)
PROT UR STRIP-MCNC: NEGATIVE MG/DL
RBC #/AREA URNS AUTO: ABNORMAL /HPF
SP GR UR STRIP.AUTO: >=1.03 (ref 1–1.03)
UROBILINOGEN UR QL STRIP.AUTO: 0.2 E.U./DL
WBC #/AREA URNS AUTO: ABNORMAL /HPF

## 2024-07-17 PROCEDURE — 99283 EMERGENCY DEPT VISIT LOW MDM: CPT

## 2024-07-17 PROCEDURE — 81025 URINE PREGNANCY TEST: CPT

## 2024-07-17 PROCEDURE — 81001 URINALYSIS AUTO W/SCOPE: CPT

## 2024-07-17 PROCEDURE — 99284 EMERGENCY DEPT VISIT MOD MDM: CPT

## 2024-07-17 PROCEDURE — 87491 CHLMYD TRACH DNA AMP PROBE: CPT

## 2024-07-17 PROCEDURE — 81514 NFCT DS BV&VAGINITIS DNA ALG: CPT

## 2024-07-17 PROCEDURE — 87591 N.GONORRHOEAE DNA AMP PROB: CPT

## 2024-07-18 LAB
C GLABRATA DNA VAG QL NAA+PROBE: NEGATIVE
C KRUSEI DNA VAG QL NAA+PROBE: NEGATIVE
C TRACH DNA SPEC QL NAA+PROBE: NEGATIVE
CANDIDA SP 6 PNL VAG NAA+PROBE: NEGATIVE
N GONORRHOEA DNA SPEC QL NAA+PROBE: NEGATIVE
T VAGINALIS DNA VAG QL NAA+PROBE: NEGATIVE
VAGINOSIS/ITIS DNA PNL VAG PROBE+SIG AMP: POSITIVE

## 2024-07-18 NOTE — DISCHARGE INSTRUCTIONS
We will call if gonorrhea, chlamydia, trichomoniasis, bacterial vaginosis, and a yeast infection are positive.  We will send treatment to pharmacy    Follow-up with your OB/GYN as soon as possible for symptoms    Return for fever, chills, pelvic pain, abdominal pain, or any other new or concerning symptoms    Sexually Transmitted Disease/HIV Prevention   CLINIC INFORMATION  Telephone  [phone] Ext. [phone]  Office Hours:  Monday - Friday, 8:00 AM - 4:30 PM  Sexually Transmitted Disease (STD) Clinic:  PLEASE CALL TO SCHEDULE: [phone]  *Note: The number of patients accepted into clinic for STD testing is subject to provider availability.  HIV Testing Clinic:  PLEASE CALL TO SCHEDULE: [phone]  Target Population:   Individuals 13 years of age or older who are experiencing symptoms or feel they may have been exposed to a sexually transmitted disease.  Geographic area served:   [facility]  Program Description:  The purpose of the STD program is to prevent and control the spread of STDs. Confidential treatment, epidemiological and educational services are provided for patients with STDs.  The STD clinic is staffed by physicians/nurse practitioners who examine, test, diagnose, and treat all STDs including: HIV, chlamydia, gonorrhea, syphilis, herpes, venereal warts, etc. Hepatitis B vaccination and Hepatitis C screening for eligible patients.  Primary Services:  Medical exam and treatment   Laboratory testing   Epidemiological services - contact tracing, partner notification   Counseling and testing   Partner notification services   T cell testing when appropriate   [employer] [place] and video library available   Information and referral services

## 2024-07-18 NOTE — ED PROVIDER NOTES
History  Chief Complaint   Patient presents with    Medical Problem     Pt reports wanting to get an STD check, reports foul smelling discharge. Denies burning with urination      The patient is a 23-year-old female with significant past medical history presents to the ED for evaluation of a several week history of foul-smelling vaginal discharge.  She reports yellow or white at times.  It is more watery than usual.  She reports symptoms did begin after she was sexually active with a new partner. She has not been sexually active since.  She otherwise denies associated fever, chills, dysuria, hematuria, abdominal pain, flank pain, pelvic pain, rash.        Prior to Admission Medications   Prescriptions Last Dose Informant Patient Reported? Taking?   aluminum-magnesium hydroxide-simethicone (MAALOX MAX) 400-400-40 MG/5ML suspension   No No   Sig: Take 10 mL by mouth every 6 (six) hours as needed for indigestion or heartburn   benzoyl peroxide-erythromycin (Benzamycin) gel   No No   Sig: Apply to affected area once or twice daily.   famotidine (PEPCID) 20 mg tablet   No No   Sig: Take 1 tablet (20 mg total) by mouth 2 (two) times a day   hydrOXYzine HCL (ATARAX) 25 mg tablet   No No   Sig: Take 1 tablet (25 mg total) by mouth every 6 (six) hours      Facility-Administered Medications: None       Past Medical History:   Diagnosis Date    Scoliosis        History reviewed. No pertinent surgical history.    Family History   Problem Relation Age of Onset    Anxiety disorder Mother     No Known Problems Father     Hypertension Maternal Grandmother     Diabetes Maternal Grandmother     Breast cancer Paternal Grandmother     Cancer Paternal Grandfather      I have reviewed and agree with the history as documented.    E-Cigarette/Vaping     E-Cigarette/Vaping Substances     Social History     Tobacco Use    Smoking status: Never    Smokeless tobacco: Never   Substance Use Topics    Alcohol use: Never    Drug use: Never        Review of Systems   Constitutional:  Negative for chills and fever.   Gastrointestinal:  Negative for abdominal pain, nausea and vomiting.   Genitourinary:  Positive for vaginal discharge. Negative for dysuria, flank pain, genital sores, hematuria, menstrual problem and vaginal bleeding.   Skin:  Negative for rash and wound.       Physical Exam  Physical Exam  Vitals and nursing note reviewed.   Constitutional:       General: She is not in acute distress.     Appearance: She is well-developed. She is not toxic-appearing.   HENT:      Head: Normocephalic and atraumatic.   Eyes:      Conjunctiva/sclera: Conjunctivae normal.   Cardiovascular:      Rate and Rhythm: Normal rate.   Pulmonary:      Effort: Pulmonary effort is normal. No respiratory distress.   Abdominal:      Palpations: Abdomen is soft.      Tenderness: There is no abdominal tenderness. There is no right CVA tenderness, left CVA tenderness, guarding or rebound.   Musculoskeletal:         General: No swelling.      Cervical back: Neck supple.   Skin:     General: Skin is warm and dry.      Capillary Refill: Capillary refill takes less than 2 seconds.   Neurological:      Mental Status: She is alert.   Psychiatric:         Mood and Affect: Mood normal.         Vital Signs  ED Triage Vitals [07/17/24 2146]   Temperature Pulse Respirations Blood Pressure SpO2   98.1 °F (36.7 °C) 68 16 121/71 99 %      Temp Source Heart Rate Source Patient Position - Orthostatic VS BP Location FiO2 (%)   Oral Monitor Sitting Right arm --      Pain Score       --           Vitals:    07/17/24 2146   BP: 121/71   Pulse: 68   Patient Position - Orthostatic VS: Sitting         Visual Acuity      ED Medications  Medications - No data to display    Diagnostic Studies  Results Reviewed       Procedure Component Value Units Date/Time    Urine Microscopic [[id number]]  (Abnormal) Collected: 07/2002    Lab Status: Final result Specimen: Urine, Clean Catch Updated: 07/17/24  2309     RBC, UA 2-4 /hpf      WBC, UA 2-4 /hpf      Epithelial Cells Moderate /hpf      Bacteria, UA None Seen /hpf      MUCUS THREADS Moderate    Chlamydia/GC amplified DNA by PCR [[id number]] Collected: 07/17/24 2256    Lab Status: In process Specimen: Urine, Other Updated: 07/17/24 2301    Molecular Vaginal Panel [[id number]] Collected: 07/17/24 2256    Lab Status: In process Specimen: Genital from Vaginal Updated: 07/17/24 2301    POCT pregnancy, urine [[id number]]  (Normal) Resulted: 07/17/24 2258    Lab Status: Final result Updated: 07/17/24 2258     EXT Preg Test, Ur Negative     Control Valid    Urine Macroscopic, POC [[id number]]  (Abnormal) Collected: 07/17/24 2253    Lab Status: Final result Specimen: Urine Updated: 07/17/24 2255     Color, UA Yellow     Clarity, UA Clear     pH, UA 5.5     Leukocytes, UA Small     Nitrite, UA Negative     Protein, UA Negative mg/dl      Glucose, UA Negative mg/dl      Ketones, UA Trace mg/dl      Urobilinogen, UA 0.2 E.U./dl      Bilirubin, UA Negative     Occult Blood, UA Trace     Specific Gravity, UA >=1.030    Narrative:      CLINITEK RESULT                   No orders to display              Procedures  Procedures         ED Course  ED Course as of 07/18/24 0539   Wed Jul 17, 2024   2259 Nitrite, UA: Negative   2259 Leukocytes, UA(!): Small   2259 Blood, UA(!): Trace   2259 PREGNANCY TEST URINE: Negative       Medical Decision Making  Differential diagnosis including but not limited to: vaginitis, cervicitis, PID, UTI, doubt TOA    Will obtain gonorrhea and Chlamydia testing.  Offered prophylactic treatment, patient would like to wait for results.  Will obtain vaginosis panel.  Will obtain UA to evaluate for UTI.  Will obtain urine pregnancy.      UA without bacteria or nitrates, doubt UTI.  Urine pregnancy negative.  Discussed with patient remainder of results will take a few days to come back.  She verbalized understanding agreement.  Verified patient's correct  number.  Advised patient to follow-up closely with OB/GYN.  She is agreeable.  Patient provided written and verbal instructions regarding local STI clinic.  Advised to avoid sexual activity until results/completion of treatment.    At the time of discharge, the patient is in no acute distress. I discussed with the patient the diagnosis, treatment plan, follow-up, return precautions, and discharge instructions; they were given the opportunity to ask questions and verbalized understanding.      Problems Addressed:  Vaginal discharge: acute illness or injury    Amount and/or Complexity of Data Reviewed  External Data Reviewed: labs and notes.  Labs: ordered. Decision-making details documented in ED Course.                 Disposition  Final diagnoses:   Vaginal discharge     Time reflects when diagnosis was documented in both MDM as applicable and the Disposition within this note       Time User Action Codes Description Comment    7/17/2024 10:59 PM Ariane Sheridan [N89.8] Vaginal discharge           ED Disposition       ED Disposition   Discharge    Condition   Stable    Date/Time   Wed Jul 17, 2024 10:59 PM    Comment   Ning Blankenship discharge to home/self care.                   Follow-up Information       Follow up With Specialties Details Why Contact Info    Aliya Kaufman PA-C Physician Assistant   [address] [place] [phone]              Discharge Medication List as of 7/17/2024 11:01 PM        CONTINUE these medications which have NOT CHANGED    Details   aluminum-magnesium hydroxide-simethicone (MAALOX MAX) 400-400-40 MG/5ML suspension Take 10 mL by mouth every 6 (six) hours as needed for indigestion or heartburn, Starting Sat 9/24/2022, Normal      benzoyl peroxide-erythromycin (Benzamycin) gel Apply to affected area once or twice daily., Normal      famotidine (PEPCID) 20 mg tablet Take 1 tablet (20 mg total) by mouth 2 (two) times a day, Starting Sat  9/24/2022, Normal      hydrOXYzine HCL (ATARAX) 25 mg tablet Take 1 tablet (25 mg total) by mouth every 6 (six) hours, Starting Fri 12/8/2023, Normal             No discharge procedures on file.    PDMP Review       None            ED Provider  Electronically Signed by             Ariane Sheridan PA-C  07/18/24 7221

## 2024-07-19 RX ORDER — METRONIDAZOLE 500 MG/1
500 TABLET ORAL 2 TIMES DAILY
Qty: 14 TABLET | Refills: 0 | Status: SHIPPED | OUTPATIENT
Start: 2024-07-19 | End: 2024-07-26